# Patient Record
Sex: MALE | Race: WHITE | Employment: FULL TIME | URBAN - METROPOLITAN AREA
[De-identification: names, ages, dates, MRNs, and addresses within clinical notes are randomized per-mention and may not be internally consistent; named-entity substitution may affect disease eponyms.]

---

## 2021-03-10 ENCOUNTER — TRANSFERRED RECORDS (OUTPATIENT)
Dept: HEALTH INFORMATION MANAGEMENT | Facility: CLINIC | Age: 61
End: 2021-03-10

## 2021-06-03 ENCOUNTER — TELEPHONE (OUTPATIENT)
Facility: CLINIC | Age: 61
End: 2021-06-03

## 2021-06-03 NOTE — TELEPHONE ENCOUNTER
Reason for Call:  Other appointment    Detailed comments: PT SEEING [facility] FOR PSYCHIATRY REFERRAL PER INSURANCE. PT WANTS TO KNOW IF HIS PREVIOUS PSYCHIATRY RECORDS NEED TO BE SENT, OR ANY OTHER RECORDS    Phone Number Patient can be reached at: Home number on file [phone] (home)    Best Time: ANYTIME, PT IS AT WORK 9-5:50 ([phone]) OTHER TIMES CALL HOME    Can we leave a detailed message on this number? YES    Call taken on 6/3/2021 at 4:33 PM by Kellee Chowdary

## 2021-06-09 ENCOUNTER — OFFICE VISIT (OUTPATIENT)
Dept: FAMILY MEDICINE | Facility: CLINIC | Age: 61
End: 2021-06-09
Payer: COMMERCIAL

## 2021-06-09 VITALS
HEART RATE: 63 BPM | OXYGEN SATURATION: 98 % | SYSTOLIC BLOOD PRESSURE: 157 MMHG | TEMPERATURE: 97.7 F | WEIGHT: 212 LBS | DIASTOLIC BLOOD PRESSURE: 95 MMHG

## 2021-06-09 DIAGNOSIS — R03.0 ELEVATED BLOOD PRESSURE READING WITHOUT DIAGNOSIS OF HYPERTENSION: ICD-10-CM

## 2021-06-09 DIAGNOSIS — F63.81 INTERMITTENT EXPLOSIVE DISORDER: ICD-10-CM

## 2021-06-09 DIAGNOSIS — Z86.59 HISTORY OF MAJOR DEPRESSION: Primary | ICD-10-CM

## 2021-06-09 PROCEDURE — 99203 OFFICE O/P NEW LOW 30 MIN: CPT | Performed by: PHYSICIAN ASSISTANT

## 2021-06-09 RX ORDER — LAMOTRIGINE 200 MG/1
TABLET ORAL
COMMUNITY
Start: 2021-06-09 | End: 2021-08-18

## 2021-06-09 RX ORDER — VENLAFAXINE HYDROCHLORIDE 150 MG/1
150 CAPSULE, EXTENDED RELEASE ORAL DAILY
COMMUNITY
End: 2021-08-18

## 2021-06-09 RX ORDER — LEVOTHYROXINE SODIUM 112 UG/1
TABLET ORAL
COMMUNITY
Start: 2020-12-23

## 2021-06-09 RX ORDER — ATORVASTATIN CALCIUM 40 MG/1
TABLET, FILM COATED ORAL
COMMUNITY
Start: 2020-12-23

## 2021-06-09 NOTE — PATIENT INSTRUCTIONS
Take your blood pressure daily at home and write down, call your doctor if readings consistently > 140/90    Psychiatry referral made today, if you do not hear from scheduling to set up, please call [phone]

## 2021-06-09 NOTE — PROGRESS NOTES
Assessment and Plan:     (Z86.59) History of major depression  (primary encounter diagnosis)  Comment: Previously saw Dr. Acosta at [facility] in Psychiatry which is closing, needs new provider in [facility] per health insurance company  Plan: MENTAL HEALTH REFERRAL  - Adult; Psychiatry;         Psychiatry; [facility]: Collaborative Care Psychiatry         Service/Bridge to Long-Term Psychiatry as         indicated ([phone]); Yes; Other: Enter         in Comments box; No; Other: [facility]         [phone]; We wi...       (F63.81) Intermittent explosive disorder  Comment: [facility]  Plan: MENTAL HEALTH REFERRAL  - Adult; Psychiatry;         Psychiatry; [facility]: Collaborative Care Psychiatry         Service/Bridge to Long-Term Psychiatry as         indicated ([phone]); Yes; Other: Enter         in Comments box; No; Other: [facility] hospital         [phone]; We wi...       (R03.0) Elevated blood pressure reading without diagnosis of hypertension  Comment: no history of HTN, asymptomatic  Plan: has BP machine at home, will take daily for next few weeks and call or see Dr. Hurley (PCP) in the next month if consisently elevated      Rona Henson PA-C        Subjective   Umberto is a 61 year old who presents for the following health issues:    Current psychiatrists office is closing and he needs a referral from [place] for a psychiatrist per insurance company  Current primary MD is Dr. Hurley ([facility])    Feels fine, no issues today, does not feel down/depressed, feels current medication regimen is working well.    BP a little high today, not currently taking anything  No headache, chest pain, shortness of breath, focal weakness      HPI       Review of Systems   See history        Objective    BP (!) 157/95 (BP Location: Left arm, Patient Position: Sitting, Cuff Size: Adult Regular)   Pulse 63   Temp 97.7  F (36.5  C) (Temporal)    Wt 96.2 kg (212 lb)   SpO2 98%   There is no height or weight on file to calculate BMI.  Physical Exam     Repeat /98 (manual)    GENERAL: healthy, alert and no distress  RESP: lungs clear to auscultation - no rales, no rhonchi, no wheezes  CV: regular rates and rhythm, normal S1 S2, no S3 or S4 and no murmur, no click or rub   MOOD: affect is bright, appropriate, speech is non-pressured

## 2021-08-18 ENCOUNTER — VIRTUAL VISIT (OUTPATIENT)
Dept: PSYCHIATRY | Facility: CLINIC | Age: 61
End: 2021-08-18
Attending: PHYSICIAN ASSISTANT
Payer: COMMERCIAL

## 2021-08-18 DIAGNOSIS — F63.81 INTERMITTENT EXPLOSIVE DISORDER: Primary | ICD-10-CM

## 2021-08-18 DIAGNOSIS — F33.0 MAJOR DEPRESSIVE DISORDER, RECURRENT EPISODE, MILD (H): ICD-10-CM

## 2021-08-18 DIAGNOSIS — F41.1 GAD (GENERALIZED ANXIETY DISORDER): ICD-10-CM

## 2021-08-18 PROCEDURE — 99204 OFFICE O/P NEW MOD 45 MIN: CPT | Mod: TEL | Performed by: NURSE PRACTITIONER

## 2021-08-18 RX ORDER — LAMOTRIGINE 200 MG/1
TABLET ORAL
Qty: 76 TABLET | Refills: 3 | Status: SHIPPED | OUTPATIENT
Start: 2021-08-18

## 2021-08-18 RX ORDER — VENLAFAXINE HYDROCHLORIDE 150 MG/1
150 CAPSULE, EXTENDED RELEASE ORAL DAILY
Qty: 30 CAPSULE | Refills: 3 | Status: SHIPPED | OUTPATIENT
Start: 2021-08-18

## 2021-08-18 NOTE — PROGRESS NOTES
"                                                         Outpatient Psychiatric Evaluation - Standard Adult    Name:  Nirmal Roman  : 1960    Source of Referral:  Primary Care Provider: Rona Portillo Canney Nguyen, {A-C   Last visit: 2021  Current Psychotherapist: none       Identifying Data:  Patient is a 61 year old,   White  male  who presents for initial visit with me.  Patient is currently employed full time. Patient attended the session alone. Consent to communicate signed for no one.  Consent for treatment signed and included in electronic medical record. Discussed limits of confidentiality today. My Practice Policy was reviewed.     Patient prefers to be called: Umberto     Chief Complaint:    No chief complaint on file.        HPI:      Juli is a 61-year-old male visiting with me today to talk about medications he is on to treat his intermittent explosive disorder that he has he has had \"all my life\".  At this point he feels stable on venlafaxine and lamotrigine and tells me this helps him stay stable.  Everyday stressors can create some mild irritability but it is mostly manageable.  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  Him and his wife adopted her when she was 4 months of age.  She has movements and is at a fall risk being in a wheelchair part of the time.  Overall, he tells me she has a bright disposition.    Mood symptoms developed for Umberto along with anxiety when he was around 4 or 5 years of age.  He tells me he grew up with an inferiority complex.  He has been in counseling for \"years\".  Medications he has tried include bupropion, Trileptal, among others.  He has been  for 26 years but in  his wife and him briefly .  He now tells me things are going well between the 2 of them.    Psychiatric Review of Symptoms:  Depression: Depressed Mood  Sleep: No " change   Suicide: No   PHQ-9 scores: No flowsheet data found.  Prema:  No symptoms   MDQ Score: Negative Screen  Anxiety: Feeling nervous, anxious, or on edge  Worrying too much about different things    JEAN CARLOS-7 scores:  No flowsheet data found.  Panic:  No symptoms   Agoraphobia:  No   PTSD:  History of Trauma   OCD:  No symptoms   Psychosis: No symptoms   ADD / ADHD: No symptoms  Gambling or shoplifting: No   Eating Disorder:  No symptoms  Sleep:   History of sleep consult/study  BIPAP machine     Psychiatric History:   Hospitalizations:none  History of Commitment? No   Past Treatment: counseling, medication(s) from physician / PCP and psychiatry  Suicide Attempts:  none  Self-injurious Behavior: Denies  Electroconvulsive Therapy (ECT) or Transcranial Magnetic Stimulation (TMS): No   Genetic Testing: No     Substance Use History:  Current use of drugs or alcohol: Denies   Patient reports no problems as a result of their drinking / drug use.   Based on the clinical interview, there  are not indications of drug or alcohol abuse.  Tobacco use: No  Caffeine:  Yes  1 sodas/day  Patient has not received chemical dependency treatment in the past  Recovery Programming Involvement: None    Past Medical History:  No past medical history on file.   Surgery: No past surgical history on file.  Allergies:     Allergies   Allergen Reactions     Penicillins Hives     Other reaction(s): Hives  PN: LW Reaction: HIVES       Primary Care Provider: Ryan Castillo  Seizures or Head Injury: No  Diet: No Restrictions  Food Allergies: No   Exercise: No regular exercise program  Supplements: Reviewed per Electronic Medical Record Today    atorvastatin (LIPITOR) 40 MG tablet, TAKE 1 TABLET(40 MG) BY MOUTH EVERY MORNING  lamoTRIgine (LAMICTAL) 200 MG tablet, Take 200mg in the am and 300mg in the evening  levothyroxine (SYNTHROID/LEVOTHROID) 112 MCG tablet, TAKE 1 TABLET BY MOUTH EVERY DAY  venlafaxine (EFFEXOR-XR) 150 MG 24 hr  capsule, Take 150 mg by mouth daily    No current facility-administered medications on file prior to visit.       The [place] Prescription Monitoring Program has been reviewed and there are no concerns about diversionary activity for controlled substances at this time.     Vital Signs:  Vitals: There were no vitals taken for this visit.    Labs:  Most recent laboratory results reviewed and the pertinent results include: Triglycerides 313, glucose 120  Most recent labs reviewed and no new labs.   No EKG on file.    Review of Systems:  10 systems (general, cardiovascular, respiratory, eyes, ENT, endocrine, GI, , M/S, neurological) were reviewed. Most pertinent finding(s) is/are: He reports no chest pain, no shortness of breath, no skin rashes, no reported headache. The remaining systems are all unremarkable.  Family History:   Patient reported family history includes: No family history on file.  Mental Illness History: Yes: grandmother  Substance Abuse History: Denies  Suicide History: Denies  Medications: Unknown     Social History:   Born: [facility]  Raised: moved from [facility], [place], [place]   Childhood: Not easy.  Family was serious - when 10 years old had a shouting episode, problems in high school, bullied a lot, few friends, called names, pushed around some, shown effeminate ways.  Father now 95 years old, mother now 92 years old  Siblings:  Sister 4 years older and sister who is 6 years younger  Highest education level was high school graduate and some college.   Employment History: [employer]  Childhood illnesses: Denies  Current Living situation:  [place], [place]  with wife and 16 year old daughter . Feels safe at home.  Children: 20 year old and 16 year old daughter   Firearms/Weapons Access: No: Patient denies   Service: No    Mental Status Examination:     Appearance:  awake, alert and mild distress  Attitude:  cooperative   Eye Contact:  adequate  Gait and Station: No  assistive Devices used and No dizziness or falls  Psychomotor Behavior:  intact station, gait and muscle tone  Oriented to:  time, person, and place  Attention Span and Concentration:  Normal  Speech:  clear, coherent and Speaks: English  Mood:  anxious and depressed  Affect:  mood congruent  Associations:  no loose associations  Thought Process:  goal oriented  Thought Content:  no evidence of suicidal ideation or homicidal ideation, no auditory hallucinations present and no visual hallucinations present  Recent and Remote Memory:  intact Not formally assessed. No amnesia.  Fund of Knowledge: appropriate  Insight:  good  Judgment:  intact  Impulse Control:  intact    Suicide Risk Assessment:  Today Nirmal Roman reports that he is having no thoughts to want to end his life or to harm other people. In addition, there are notable risk factors for self-harm, including anxiety. However, risk is mitigated by commitment to family, history of seeking help when needed, future oriented, denies suicidal intent or plan and denies homicidal ideation, intent, or plan. Therefore, based on all available evidence including the factors cited above, Nirmal Roman does not appear to be at imminent risk for self-harm, does not meet criteria for a 72-hr hold, and therefore remains appropriate for ongoing outpatient level of care.  A thorough assessment of risk factors related to suicide and self-harm have been reviewed and are noted above. The patient convincingly denies acute suicidality on several occasions. Local community safety resources reviewed and printed for patient to use if needed. There was no deceit detected, and the patient presented in a manner that was believable.     DSM5  Diagnosis:  296.31 (F33.0) Major Depressive Disorder, Recurrent Episode, Mild _ and With mixed features  300.02 (F41.1) Generalized Anxiety Disorder  312.34 (F62.81) Intermittent Explosive Disorder  with panic attack    Medical Comorbidities  Include:   Patient Active Problem List    Diagnosis Date Noted     History of major depression 06/09/2021     Priority: Medium     Intermittent explosive disorder 06/09/2021     Priority: Medium     [facility]         A 12-item WHODAS 2.0 assessment was completed by the patient today and recorded in [employer].  No flowsheet data found.    The Patient Activation Measure (CHRISTINE) score was completed and recorded in EPIC. This assesses patient knowledge, skill, and confidence for self-management. No flowsheet data found.             Impression:  Nirmal Roman met with me to talk about staying on lamotrigine and venlafaxine for his mood dysregulation.  He reports having anxiety since the age of 4 or 5 years old and has had multiple counseling sessions along with medication trials.  He will continue his venlafaxine and his lamotrigine as prescribed.  I offered talk therapy should he find a need for it as working home stressors skin exacerbate his symptoms.    Medication side effects and alternatives reviewed. Health promotion activities recommended and reviewed today. All questions addressed. Education and counseling completed regarding risks and benefits of medications and psychotherapy options. Collaborative Care Psychiatry Service model reviewed today. Recommend therapy for additional support.     Treatment Plan:     1.  Continue venlafaxine  mg daily  2.  Continue lamotrigine 200 mg in the morning and 300 mg in the evening  3.  Talk therapy available should you find a need for it      Continue all other medical directions per primary care provider.     Continue all other medications as reviewed per electronic medical record today.     Safety plan reviewed. To the Emergency Department as needed or call after hours crisis line at [phone] or [phone]. [place] Crisis Text Line: Text [place] to 294940  or  Suicide LifeLine Chat: suicidepreventionlifeline.org/chat/    To schedule  individual or family therapy, call [facility] at [phone].     Schedule an appointment with me in 3 months or sooner as needed.  Call [facility] at [phone] to schedule.    Follow up with primary care provider as planned or for acute medical concerns.    Call the psychiatric nurse line with medication questions or concerns at [phone].    MyChart may be used to communicate with your provider, but this is not intended to be used for emergencies.    Crisis Resources:    National Suicide Prevention Lifeline: 723.350.8122 (TTY: 876.908.1959). Call anytime for help.  (www.suicidepreventionlifeline.org)  National [place] on Mental Illness (www.simba.org): 584.306.2557 or 272-694-4637.   Mental Health Association (www.mentalhealth.org): 868.689.9569 or 134-963-7953.  [place] Crisis Text Line: Text [place] to 251804  Suicide LifeLine Chat: suicidePodPonicsline.org/chat    Administrative Billing:   Time spent with patient was 60 minutes and greater than 50% of time or 40 minutes was spent in counseling and coordination of care regarding above diagnoses and treatment plan.    Patient Status:  Patient will continue to be seen for ongoing consultation and stabilization.    Signed:   NEGRA Alarcon-BC   Psychiatry

## 2021-08-18 NOTE — NURSING NOTE
________________________________________  Medications Phoned  to Pharmacy [] yes [x]no  Name of Pharmacist:  List Medications, including dose, quantity and instructions    Medications ordered this visit were e-scribed.  Verified by order class [x] yes  [] no  Lamictal and Effexor XR  Medication changes or discontinuations were communicated to patient's pharmacy: [] yes  [x] no    Dictation completed at time of chart check: [] yes  [x] no    I have checked the documentation for today s encounters and the above information has been reviewed and completed.

## 2021-08-18 NOTE — PATIENT INSTRUCTIONS
1.  Continue venlafaxine  mg daily  2.  Continue lamotrigine 200 mg in the morning and 300 mg in the evening  3.  Talk therapy available should you find a need for it      Continue all other medical directions per primary care provider.     Continue all other medications as reviewed per electronic medical record today.     Safety plan reviewed. To the Emergency Department as needed or call after hours crisis line at [phone] or [phone]. [place] Crisis Text Line: Text [place] to 751869  or  Suicide LifeLine Chat: suicideNeurescue.org/chat/    To schedule individual or family therapy, call [facility] at [phone].     Schedule an appointment with me in 3 months or sooner as needed.  Call [facility] at [phone] to schedule.    Follow up with primary care provider as planned or for acute medical concerns.    Call the psychiatric nurse line with medication questions or concerns at [phone].    [employer] may be used to communicate with your provider, but this is not intended to be used for emergencies.    Crisis Resources:    National Suicide Prevention Lifeline: 437.374.2918 (TTY: 912.101.7534). Call anytime for help.  (www.suicidepreventionlifeline.org)  National [place] on Mental Illness (www.simba.org): 327.624.2121 or 486-386-5633.   Mental Health Association (www.mentalhealth.org): 781.406.4660 or 184-085-2686.  [place] Crisis Text Line: Text [place] to 509640  Suicide LifeLine Chat: suicideNeurescue.org/chat